# Patient Record
Sex: MALE | Race: BLACK OR AFRICAN AMERICAN | NOT HISPANIC OR LATINO | URBAN - METROPOLITAN AREA
[De-identification: names, ages, dates, MRNs, and addresses within clinical notes are randomized per-mention and may not be internally consistent; named-entity substitution may affect disease eponyms.]

---

## 2019-03-05 ENCOUNTER — EMERGENCY (EMERGENCY)
Facility: HOSPITAL | Age: 34
LOS: 0 days | Discharge: HOME | End: 2019-03-05
Admitting: PHYSICIAN ASSISTANT

## 2019-03-05 VITALS
TEMPERATURE: 99 F | DIASTOLIC BLOOD PRESSURE: 82 MMHG | OXYGEN SATURATION: 97 % | SYSTOLIC BLOOD PRESSURE: 126 MMHG | HEART RATE: 76 BPM | RESPIRATION RATE: 18 BRPM

## 2019-03-05 DIAGNOSIS — K08.89 OTHER SPECIFIED DISORDERS OF TEETH AND SUPPORTING STRUCTURES: ICD-10-CM

## 2019-03-05 DIAGNOSIS — K02.9 DENTAL CARIES, UNSPECIFIED: ICD-10-CM

## 2019-03-05 DIAGNOSIS — K04.7 PERIAPICAL ABSCESS WITHOUT SINUS: ICD-10-CM

## 2019-03-05 NOTE — ED PROVIDER NOTE - CLINICAL SUMMARY MEDICAL DECISION MAKING FREE TEXT BOX
Pt with dental pain and probable abscess formation. Airway is intact. no systemic symptoms or signs. Will transfer to dental for eval.

## 2019-03-05 NOTE — ED PROVIDER NOTE - PHYSICAL EXAMINATION
CONST: Well appearing in NAD  EYES: PERRL, EOMI, Sclera and conjunctiva clear.   ENT: Oropharynx normal appearing, no erythema or exudates. Uvula midline. left lower dental decay #20 with adjacent soft tissue swelling. No sublingual swelling. Airway is patent  NECK: Non-tender, no meningeal signs  CARD: Normal S1 S2; Normal rate and rhythm  RESP: Equal BS B/L, No wheezes, rhonchi or rales. No distress  MS: Normal ROM in all extremities. No midline spinal tenderness.  SKIN: Warm, dry, no acute rashes. Good turgor  NEURO: A&Ox3, No focal deficits. Strength 5/5 with no sensory deficits. Steady gait

## 2019-03-05 NOTE — CONSULT NOTE ADULT - SUBJECTIVE AND OBJECTIVE BOX
Patient is a 33y old  Male who presents with a chief complaint of lower left buccal vestibule swelling    HPI: Patient states he woke up this morning with a lower left facial swelling.      PAST MEDICAL & SURGICAL HISTORY:    (   ) heart valve replacement  (   ) joint replacement  (   ) pregnancy    MEDICATIONS  (STANDING):    MEDICATIONS  (PRN):      REVIEW OF SYSTEMS      General:	    Skin/Breast:  	  Ophthalmologic:  	  ENMT:	    Respiratory and Thorax:  	  Cardiovascular:	    Gastrointestinal:	    Genitourinary:	    Musculoskeletal:	    Neurological:	    Psychiatric:	    Hematology/Lymphatics:	    Endocrine:	    Allergic/Immunologic:	    Allergies      Intolerances        FAMILY HISTORY:      *SOCIAL HISTORY: (   ) Tobacco; (   ) ETOH    Vital Signs Last 24 Hrs  T(C): 37.1 (05 Mar 2019 13:31), Max: 37.1 (05 Mar 2019 13:31)  T(F): 98.7 (05 Mar 2019 13:31), Max: 98.7 (05 Mar 2019 13:31)  HR: 76 (05 Mar 2019 13:31) (76 - 76)  BP: 126/82 (05 Mar 2019 13:31) (126/82 - 126/82)  BP(mean): --  RR: 18 (05 Mar 2019 13:31) (18 - 18)  SpO2: 97% (05 Mar 2019 13:31) (97% - 97%)    LABS:                  Last Dental Visit: <<Unknown  >>    EOE:  TMJ ( -  ) clicks                     ( -  ) pops                     (  - ) crepitus             Mandible <<FROM>>             Facial bones and MOM <<grossly intact>>             ( -  ) trismus             ( -  ) lymphadenopathy             ( +  ) swelling             (  + ) asymmetry             (  + ) palpation             (  - ) dyspnea             (  - ) dysphagia             (  - ) loss of consciousness    IOE:  <<permanent>> dentition:            <<multiple carious teeth>>             Dentition Present <<  >>                     Mobility <<  >>                     Caries <<  >>                hard/soft palate:  (   ) palatal torus, <<No pathology noted>>            tongue/FOM <<No pathology noted>>            labial/buccal mucosa <<No pathology noted>>           (+   ) percussion           ( +  ) palpation           (  + ) swelling            ( +  ) abscess           (  - ) sinus tract    *DENTAL RADIOGRAPHS: 1 periapical tooth #19 (lower left first molar) taken and interpreted    RADIOLOGY & ADDITIONAL STUDIES:N/A    *ASSESSMENT: Exam reveals lower left buccal vestibule swelling just buccal of tooth #19 ( lower left first molar). Patient does not have fever, no chills, no dysphagia. Patient states no known drug allergies.    *PLAN: Extract tooth #19 and drainage.    PROCEDURE:   Verbal and written consent given.  Anesthesia: <<3.6cc 4% septocaine 1:100,000 epinephrine given locally.    >>   Treatment: <<Risks and benefits as per Oral Surgery sheet dated 7/13/00 discussed. Consent and side site signed. Local anesthetic given. Nonsurgical extraction tooth #19 completed with elevation. Post operative periapical taken for confirmation. Hemostasis achieved. Post operative instructions given.    >>     RECOMMENDATIONS:  1) <<300mg Clindamycin q6h x 7days; 600mg Ibuprofen q6h x 4 days PRN for pain; Follow up with private dentist for comprehensive oral evaluation.  >>  2) Dental F/U with outpatient dentist for comprehensive dental care.   3) If any difficulty swallowing/breathing, fever occur, return to ER.     Savage Marie DDS, pager #[phone]

## 2019-03-05 NOTE — ED PROVIDER NOTE - OBJECTIVE STATEMENT
Pt had mild intermittent left lower toothache yesterday but woke this AM with swelling. No fever or hx of DM

## 2020-08-22 ENCOUNTER — EMERGENCY (EMERGENCY)
Facility: HOSPITAL | Age: 35
LOS: 0 days | Discharge: HOME | End: 2020-08-22
Attending: EMERGENCY MEDICINE | Admitting: EMERGENCY MEDICINE
Payer: COMMERCIAL

## 2020-08-22 VITALS
WEIGHT: 214.95 LBS | SYSTOLIC BLOOD PRESSURE: 131 MMHG | HEART RATE: 109 BPM | TEMPERATURE: 99 F | RESPIRATION RATE: 20 BRPM | OXYGEN SATURATION: 100 % | DIASTOLIC BLOOD PRESSURE: 84 MMHG

## 2020-08-22 VITALS — HEART RATE: 88 BPM

## 2020-08-22 DIAGNOSIS — S05.02XA INJURY OF CONJUNCTIVA AND CORNEAL ABRASION WITHOUT FOREIGN BODY, LEFT EYE, INITIAL ENCOUNTER: ICD-10-CM

## 2020-08-22 DIAGNOSIS — H57.12 OCULAR PAIN, LEFT EYE: ICD-10-CM
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
## 2020-08-22 DIAGNOSIS — H57.10 OCULAR PAIN, UNSPECIFIED EYE: ICD-10-CM

## 2020-08-22 PROCEDURE — 99283 EMERGENCY DEPT VISIT LOW MDM: CPT

## 2020-08-22 RX ORDER — ERYTHROMYCIN BASE 5 MG/GRAM
1 OINTMENT (GRAM) OPHTHALMIC (EYE)
Qty: 3.5 | Refills: 0
Start: 2020-08-22 | End: 2020-08-28

## 2020-08-22 RX ORDER — POLYMYXIN B SULF/TRIMETHOPRIM 10000-1/ML
1 DROPS OPHTHALMIC (EYE) ONCE
Refills: 0 | Status: COMPLETED | OUTPATIENT
Start: 2020-08-22 | End: 2020-08-22

## 2020-08-22 RX ADMIN — Medication 1 DROP(S): at 20:02

## 2020-08-22 NOTE — ED PROVIDER NOTE - PROGRESS NOTE DETAILS
spoke with optho dr. santiago, states if no vision loss no concerned should f/u with optho clinic monday or tuesday 08/24, 08/25. give ofloxacillin or polytrim. pt given polytrim, rx erythromycin ointment to use nightly. advised to f/u with opthamology clinic 08/24 at 8am advised of return precaution such as loss of vision, blurry vision, double vision, fever, chills, eye swelling or bulging, or pain with eom. agreeable to dc.

## 2020-08-22 NOTE — ED PROVIDER NOTE - NSFOLLOWUPCLINICS_GEN_ALL_ED_FT
[facility]  Ophthalmolgy  [address], [address]  [place] [address]  Phone: [phone]  Fax:   Follow Up Time: 1-3 Days

## 2020-08-22 NOTE — ED PROVIDER NOTE - PATIENT PORTAL LINK FT
You can access the FollowMyHealth Patient Portal offered by [facility] by registering at the following website: [email or web address]. By joining [facility]’s FollowMyHealth portal, you will also be able to view your health information using other applications (apps) compatible with our system.

## 2020-08-22 NOTE — ED PROVIDER NOTE - ATTENDING CONTRIBUTION TO CARE
Patient states that around 3 pm was playing with his 2 yr old daughter, who accidentally poked in his Lt eye, here c/o Lt eye pain/tearing, denies any other form of trauma. Denies changes in vision. Denies contact lenses use.   Vitals noted  Face: no periorbita swelling, no erythema, no crepitus.   LT eye: vision: 20/20;  ABAD/EOMI, mild conjunctival injection, no discharge, +corneal abrasion, sidel negative. No Hyphema, AC: No cells/flare.   CNS: awake, alert, speaking in full sentences and is comfortable.   A/P: LT eye corneal abrasion  Discussed with ophthalmology, will follow recommendations.   Td is up to date.

## 2020-08-22 NOTE — ED PROVIDER NOTE - PHYSICAL EXAMINATION
Physical Exam    Vital Signs: I have reviewed the initial vital signs.  Constitutional: well-nourished, appears stated age, no acute distress  Eyes: Conjunctiva pink, Sclera clear, PERRLA, EOMI without pain. negative hyphema. negative subconjunctival hemorrhage. visual fields intact. visual acuit is: 20/20 b/l. visible corneal abrasion to the medial upper outer aspect of the left cornea. fluoroscein stain reveals: (+) corneal abrasion to the medial upper outer aspect of the left cornea. no purulent drainage. no matting of the eyelashes. no foreign body. no orbital tenderness or erythema.   Integumentary: warm, dry, no rash  Neurologic: awake, alert  Psychiatric: appropriate mood, appropriate affect

## 2020-08-22 NOTE — ED PROVIDER NOTE - NSFOLLOWUPINSTRUCTIONS_ED_ALL_ED_FT
Corneal Abrasion    The cornea is the clear covering at the front and center of the eye. This very thin tissue is made up of many layers. If a scratch or injury causes the corneal epithelium to come off, it is called a corneal abrasion. Symptoms include eye pain, difficulty keeping eye open, and light sensitivity. Do not drive or operate machinery if your eye is patched.    SEEK MEDICAL CARE IF YOU HAVE THE FOLLOWING SYMPTOMS: discharge from eyes, changes in vision, worsening of symptoms.    FOLLOW UP WITH OPTHO CLINIC ON MONDAY, AUGUST 24, 2020 AT 8AM. BRING ER DISCHARGE PAPERS WITH YOU. Corneal Abrasion    The cornea is the clear covering at the front and center of the eye. This very thin tissue is made up of many layers. If a scratch or injury causes the corneal epithelium to come off, it is called a corneal abrasion. Symptoms include eye pain, difficulty keeping eye open, and light sensitivity. Do not drive or operate machinery if your eye is patched.    SEEK MEDICAL CARE IF YOU HAVE THE FOLLOWING SYMPTOMS: discharge from eyes, changes in vision, worsening of symptoms.    FOLLOW UP WITH OPTHO CLINIC ON MONDAY, AUGUST 24, 2020 AT 8AM. BRING ER DISCHARGE PAPERS WITH YOU.    USE POLYTRIM DROPS: 1 DROP IN THE LEFT EYE EVERY 3 HOURS FOR 7 DAYS.   apply erythromycin ointment to left eye at night
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
## 2020-08-22 NOTE — ED PROVIDER NOTE - NS ED ROS FT
CONST: No fever, chills or bodyaches  EYES: (+) left eye pain and tearing. No redness, or visual changes.  ENT: No ear pain or discharge, nasal discharge or congestion. No sore throat  CARD: No chest pain, palpitations  RESP: No SOB, cough, hemoptysis. No hx of asthma or COPD  GI: No abdominal pain, N/V/D  : No urinary symptoms  MS: No joint pain, back pain or extremity pain/injury  SKIN: No rashes  NEURO: No headache, dizziness, paresthesias or LOC

## 2020-08-22 NOTE — ED PROVIDER NOTE - OBJECTIVE STATEMENT
34 y/o male with no significant PMH presents to the ED for left eye pain and tearing that began around 3:30pm after he was playing with his daughter who accidently hit him in the left eye. pt is up to date on tetanus. pt denies vision loss, blurry vision, double vision, pain with eye movement, use of contracts or glasses, headaches, dizziness, fever, or chill.

## 2021-01-18 ENCOUNTER — OUTPATIENT (OUTPATIENT)
Dept: OUTPATIENT SERVICES | Facility: HOSPITAL | Age: 36
LOS: 1 days | Discharge: HOME | End: 2021-01-18

## 2021-01-18 DIAGNOSIS — Z11.59 ENCOUNTER FOR SCREENING FOR OTHER VIRAL DISEASES: ICD-10-CM

## 2021-01-26 ENCOUNTER — EMERGENCY (EMERGENCY)
Facility: HOSPITAL | Age: 36
LOS: 0 days | Discharge: HOME | End: 2021-01-26
Attending: STUDENT IN AN ORGANIZED HEALTH CARE EDUCATION/TRAINING PROGRAM | Admitting: STUDENT IN AN ORGANIZED HEALTH CARE EDUCATION/TRAINING PROGRAM
Payer: COMMERCIAL

## 2021-01-26 VITALS
DIASTOLIC BLOOD PRESSURE: 78 MMHG | SYSTOLIC BLOOD PRESSURE: 150 MMHG | RESPIRATION RATE: 18 BRPM | HEART RATE: 96 BPM | TEMPERATURE: 97 F | OXYGEN SATURATION: 100 % | WEIGHT: 220.02 LBS

## 2021-01-26 DIAGNOSIS — Z23 ENCOUNTER FOR IMMUNIZATION: ICD-10-CM

## 2021-01-26 DIAGNOSIS — S21.212A LACERATION WITHOUT FOREIGN BODY OF LEFT BACK WALL OF THORAX WITHOUT PENETRATION INTO THORACIC CAVITY, INITIAL ENCOUNTER: ICD-10-CM
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
## 2021-01-26 PROCEDURE — 99283 EMERGENCY DEPT VISIT LOW MDM: CPT

## 2021-01-26 PROCEDURE — 71045 X-RAY EXAM CHEST 1 VIEW: CPT | Mod: 26

## 2021-01-26 PROCEDURE — 99285 EMERGENCY DEPT VISIT HI MDM: CPT

## 2021-01-26 RX ORDER — TETANUS TOXOID, REDUCED DIPHTHERIA TOXOID AND ACELLULAR PERTUSSIS VACCINE, ADSORBED 5; 2.5; 8; 8; 2.5 [IU]/.5ML; [IU]/.5ML; UG/.5ML; UG/.5ML; UG/.5ML
0.5 SUSPENSION INTRAMUSCULAR ONCE
Refills: 0 | Status: COMPLETED | OUTPATIENT
Start: 2021-01-26 | End: 2021-01-26

## 2021-01-26 RX ADMIN — TETANUS TOXOID, REDUCED DIPHTHERIA TOXOID AND ACELLULAR PERTUSSIS VACCINE, ADSORBED 0.5 MILLILITER(S): 5; 2.5; 8; 8; 2.5 SUSPENSION INTRAMUSCULAR at 22:44

## 2021-01-26 NOTE — ED PROVIDER NOTE - NS ED ROS FT
Review of Systems    Constitutional: (-) fever   Eyes/ENT: (-) vision changes   Cardiovascular: (-) chest pain, (-) syncope (-) palpitations  Respiratory: (-) cough, (-) shortness of breath  Gastrointestinal: (-) vomiting, (-) diarrhea (-)black/bloody stools (-) abdominal pain  Genitourinary:  (-) dysuria   Musculoskeletal: (-) neck pain, (-) back pain, (-) leg pain/swelling  Integumentary: (-) rash, (-) edema see hpi   Neurological: (-) headache, (-) confusion  Hematologic: (-) easy bruising   Allergic/Immunologic: (-) pruritus
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
## 2021-01-26 NOTE — ED PROVIDER NOTE - PROVIDER TOKENS
FREE:[LAST:[[facility] ED in 7-10 DAYS FOR SUTURE REMOVAL],PHONE:[(   )    -],FAX:[(   )    -]]
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
## 2021-01-26 NOTE — ED PROVIDER NOTE - CARE PROVIDER_API CALL
[facility] ED in 7-10 DAYS FOR SUTURE REMOVAL,   Phone: (   )    -  Fax: (   )    -  Follow Up Time:
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
## 2021-01-26 NOTE — ED PROVIDER NOTE - PHYSICAL EXAMINATION
PHYSICAL EXAM:    GENERAL: Alert, appears stated age, well appearing, non-toxic  SKIN: Warm, pink and dry. MMM.   HEAD: NC, AT, no step offs   EYE: Normal lids/conjunctiva  ENT: Normal hearing, patent oropharynx  NECK: +supple. No meningismus, or JVD, +Trachea midline. no tenderness/step offs.   Pulm: Bilateral BS, normal resp effort, no wheezes, stridor, or retractions  CV: RRR, no M/R/G, 2+and = radial pulses  Abd: soft, non-tender, non-distended  Mskel: no erythema, cyanosis, edema. no calf tenderness. +L upper back with 6cm superficial laceration. Clean laceration without discharge, active bleeding, change in color, change in sensation. no FB.  Neuro: AAOx3, no sensory/motor deficits, CN 2-12 intact. No speech slurring, pronator drift, facial asymmetry. normal finger-to-nose b/l. 5/5 strength throughout. normal gait.

## 2022-06-23 NOTE — ED ADULT NURSE NOTE - NS ED NURSE NOTE DISPO AOU
AOU-OTHER Solaraze Pregnancy And Lactation Text: This medication is Pregnancy Category B and is considered safe. There is some data to suggest avoiding during the third trimester. It is unknown if this medication is excreted in breast milk.

## 2024-09-21 ENCOUNTER — EMERGENCY (EMERGENCY)
Facility: HOSPITAL | Age: 39
LOS: 0 days | Discharge: HOME | End: 2024-09-21
Attending: STUDENT IN AN ORGANIZED HEALTH CARE EDUCATION/TRAINING PROGRAM
Payer: COMMERCIAL

## 2024-09-21 VITALS
TEMPERATURE: 99 F | DIASTOLIC BLOOD PRESSURE: 92 MMHG | RESPIRATION RATE: 18 BRPM | OXYGEN SATURATION: 92 % | HEART RATE: 75 BPM | SYSTOLIC BLOOD PRESSURE: 159 MMHG | HEIGHT: 72 IN
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
## 2024-09-21 DIAGNOSIS — Z04.1 ENCOUNTER FOR EXAMINATION AND OBSERVATION FOLLOWING TRANSPORT ACCIDENT: ICD-10-CM
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
## 2024-09-21 PROBLEM — Z78.9 OTHER SPECIFIED HEALTH STATUS: Chronic | Status: ACTIVE | Noted: 2021-01-26

## 2024-09-21 PROCEDURE — 99283 EMERGENCY DEPT VISIT LOW MDM: CPT

## 2024-09-21 PROCEDURE — 99282 EMERGENCY DEPT VISIT SF MDM: CPT
